# Patient Record
Sex: FEMALE | Race: WHITE | NOT HISPANIC OR LATINO | URBAN - METROPOLITAN AREA
[De-identification: names, ages, dates, MRNs, and addresses within clinical notes are randomized per-mention and may not be internally consistent; named-entity substitution may affect disease eponyms.]

---

## 2017-09-13 ENCOUNTER — IMPORTED ENCOUNTER (OUTPATIENT)
Dept: URBAN - METROPOLITAN AREA CLINIC 1 | Facility: CLINIC | Age: 57
End: 2017-09-13

## 2017-09-13 PROBLEM — H25.813: Noted: 2017-09-13

## 2017-09-13 PROBLEM — H04.123: Noted: 2017-09-13

## 2017-09-13 PROBLEM — H40.013: Noted: 2017-09-13

## 2017-09-13 PROCEDURE — 92015 DETERMINE REFRACTIVE STATE: CPT

## 2017-09-13 PROCEDURE — 92014 COMPRE OPH EXAM EST PT 1/>: CPT

## 2017-09-13 PROCEDURE — 92250 FUNDUS PHOTOGRAPHY W/I&R: CPT

## 2017-09-13 NOTE — PATIENT DISCUSSION
1.  Cataract OU: Observe for now without intervention. The patient was advised to contact us if any change or worsening of vision2. Dry Eyes OU -- Recommended to patient to use Artificial Tears BID OU3. COAG Suspect OU: (0.40/0.60)  IOP was 23 OU today.  -Fm HX. Condition was discussed with patient. Will monitor patient for progression. Fundus photos were ordered today. 4. Return for an appointment in 1 month for 10 OCT and VF 24-2 with Dr. Gasper Taylor.

## 2017-10-11 ENCOUNTER — IMPORTED ENCOUNTER (OUTPATIENT)
Dept: URBAN - METROPOLITAN AREA CLINIC 1 | Facility: CLINIC | Age: 57
End: 2017-10-11

## 2017-10-11 PROBLEM — H40.013: Noted: 2017-10-11

## 2017-10-11 PROCEDURE — 92083 EXTENDED VISUAL FIELD XM: CPT

## 2017-10-11 PROCEDURE — 99213 OFFICE O/P EST LOW 20 MIN: CPT

## 2017-10-11 PROCEDURE — 92133 CPTRZD OPH DX IMG PST SGM ON: CPT

## 2017-10-11 NOTE — PATIENT DISCUSSION
1. COAG Suspect OU: (0.40/0.60)  IOP 20/22. Patient is considered as high risk. Condition was discussed with patient. Will monitor patient for progression. OCT was done today results was minimal thinning OU 2. Return for an appointment in 6 months for 10. with Dr. Tommy Smith.

## 2018-07-16 ENCOUNTER — IMPORTED ENCOUNTER (OUTPATIENT)
Dept: URBAN - METROPOLITAN AREA CLINIC 1 | Facility: CLINIC | Age: 58
End: 2018-07-16

## 2018-07-16 PROBLEM — H16.143: Noted: 2018-07-16

## 2018-07-16 PROBLEM — H04.123: Noted: 2018-07-16

## 2018-07-16 PROBLEM — H25.813: Noted: 2018-07-16

## 2018-07-16 PROBLEM — H40.023: Noted: 2018-07-16

## 2018-07-16 PROCEDURE — 99213 OFFICE O/P EST LOW 20 MIN: CPT

## 2018-07-16 NOTE — PATIENT DISCUSSION
1. COAG Suspect OU (CD: 0.40 / 0.60) -- IOP 20 OU today. Negative family h/o Glaucoma. Patient is considered high risk. Condition was discussed with patient and patient understands. Will continue to monitor patient for any progression in condition. Patient was advised to call us with any problems questions or concerns. 2.  MATT w/ PEK OU -- Recommend the frequent use of OTC AT's BID-QID OU3. Cataracts OU -- Observe for now without intervention. The patient was advised to contact us if any change or worsening of visionReturn for an appointment in 6 MO for a 30 / 24-2 HVF / [place] Parent / CC OU with Dr. Joaquín Gamez.

## 2018-07-16 NOTE — PATIENT DISCUSSION
1. COAG Suspect OU: (0.40/0.60)  IOP 20/22. Patient is considered as high risk. Condition was discussed with patient. Will monitor patient for progression.  OCT was done today results was minimal thinning OU

## 2019-01-23 ENCOUNTER — IMPORTED ENCOUNTER (OUTPATIENT)
Dept: URBAN - METROPOLITAN AREA CLINIC 1 | Facility: CLINIC | Age: 59
End: 2019-01-23

## 2019-01-23 PROBLEM — H25.813: Noted: 2019-01-23

## 2019-01-23 PROBLEM — H04.123: Noted: 2019-01-23

## 2019-01-23 PROBLEM — H16.143: Noted: 2019-01-23

## 2019-01-23 PROBLEM — H40.023: Noted: 2019-01-23

## 2019-01-23 PROCEDURE — 92014 COMPRE OPH EXAM EST PT 1/>: CPT

## 2019-01-23 PROCEDURE — 92083 EXTENDED VISUAL FIELD XM: CPT

## 2019-01-23 PROCEDURE — 92133 CPTRZD OPH DX IMG PST SGM ON: CPT

## 2019-01-23 PROCEDURE — 92015 DETERMINE REFRACTIVE STATE: CPT

## 2019-01-23 NOTE — PATIENT DISCUSSION
1.  Glaucoma Suspect OU (CD 0.40/0.60): No progression by OCT minimal RNFL thinning OU. Documented Peripapillary Atrophy OU. HVF WNL OU. IOP 18/19. Negative family hx. Consider starting topical therapy if any future progression. Patient is considered high risk. Condition was discussed with patient and patient understands. Will continue to monitor patient for any progression in condition. Patient was advised to call us with any problems questions or concerns. 2.  MATT w/ PEK OU- Recommend ATs TID OU routinely 3. Cataract OU: Observe for now without intervention. The patient was advised to contact us if any change or worsening of vision4. Corneal Neovascularization OU MRX for glasses givenReturn for an appointment in 1 month 10/CTL Fit (check ks consider different CTLs) with Dr. Shah.

## 2019-03-05 ENCOUNTER — IMPORTED ENCOUNTER (OUTPATIENT)
Dept: URBAN - METROPOLITAN AREA CLINIC 1 | Facility: CLINIC | Age: 59
End: 2019-03-05

## 2019-03-05 PROBLEM — H25.813: Noted: 2019-03-05

## 2019-03-05 PROBLEM — H16.143: Noted: 2019-03-05

## 2019-03-05 PROBLEM — H04.123: Noted: 2019-03-05

## 2019-03-05 PROCEDURE — 99213 OFFICE O/P EST LOW 20 MIN: CPT

## 2019-03-05 NOTE — PATIENT DISCUSSION
1.  MATT w/ PEK OU -- Improved. Recommend continue the frequent use of OTC AT's TID-QID OU Routinely. 2.  Cataracts OU -- Observe for now without intervention. The patient was advised to contact us if any change or worsening of vision. 3. Glaucoma Suspect OU (CD: 0.40/0.60) -- IOP stable today at 21 / 20. Documented Peripapillary Atrophy OU. Negative family hx. Consider starting topical therapy if any future progression. Patient is considered high risk. Condition was discussed with patient and patient understands. Will continue to monitor patient for any progression in condition. Patient was advised to call us with any problems questions or concerns. 4.  Corneal Neovascularization OUFinalized CTL Rx was given to patient today. Return for an appointment in 6 MO for a 10 / Tear Lab OU (Dry Eye / K Check OU) with Dr. Jacobo Long.

## 2019-03-05 NOTE — PATIENT DISCUSSION
1.  Glaucoma Suspect OU (CD 0.40/0.60): No progression by OCT minimal RNFL thinning OU. Documented Peripapillary Atrophy OU. HVF WNL OU. IOP 18/19. Negative family hx. Consider starting topical therapy if any future progression. Patient is considered high risk. Condition was discussed with patient and patient understands. Will continue to monitor patient for any progression in condition. Patient was advised to call us with any problems questions or concerns. 2.  MATT w/ PEK OU- Recommend ATs TID OU routinely 3. Cataracts OU -- Observe for now without intervention. The patient was advised to contact us if any change or worsening of vision4.   Corneal Neovascularization OU

## 2019-09-11 ENCOUNTER — IMPORTED ENCOUNTER (OUTPATIENT)
Dept: URBAN - METROPOLITAN AREA CLINIC 1 | Facility: CLINIC | Age: 59
End: 2019-09-11

## 2019-09-11 PROBLEM — H25.813: Noted: 2019-09-11

## 2019-09-11 PROBLEM — H16.143: Noted: 2019-09-11

## 2019-09-11 PROBLEM — H04.123: Noted: 2019-09-11

## 2019-09-11 PROCEDURE — 83861 MICROFLUID ANALY TEARS: CPT

## 2019-09-11 PROCEDURE — 99213 OFFICE O/P EST LOW 20 MIN: CPT

## 2019-09-11 NOTE — PATIENT DISCUSSION
1.  MATT w/ PEK OU -- Tear Lab Results: OD- 301 / OS- 361 (9/11/19). Stable. Recommend the frequent use of OTC AT's BID-QID OU Routinely. Consider Restasis / Antonia Solo in the future PRN. 2.  Cataracts OU -- Observe for now without intervention. The patient was advised to contact us if any change or worsening of vision. 3. Glaucoma Suspect OU (CD: 0.40 / 0.60) -- IOP stable today at 19 / 20. IOP T-MAX: 23 OU. Asymmetric Cupping OS > OD. Negative Family h/o Glaucoma. Documented Peripapillary Atrophy OU. Consider starting topical therapy if any future changes / progression noted. Patient is considered high risk. Condition was discussed with patient and patient understands. Will continue to monitor patient for any progression in condition. Patient was advised to call us with any problems questions or concerns. 4.  Corneal Neovascularization OU -- Stable. Return for an appointment in 6 MO for a 27 / [place] / Bony5 Dr Ryley Judd with Dr. Kb Berger.

## 2020-08-31 ENCOUNTER — IMPORTED ENCOUNTER (OUTPATIENT)
Dept: URBAN - METROPOLITAN AREA CLINIC 1 | Facility: CLINIC | Age: 60
End: 2020-08-31

## 2020-08-31 PROBLEM — H04.123: Noted: 2020-08-31

## 2020-08-31 PROBLEM — H40.023: Noted: 2020-08-31

## 2020-08-31 PROBLEM — H16.143: Noted: 2020-08-31

## 2020-08-31 PROBLEM — H25.813: Noted: 2020-08-31

## 2020-08-31 PROCEDURE — 92133 CPTRZD OPH DX IMG PST SGM ON: CPT

## 2020-08-31 PROCEDURE — 92015 DETERMINE REFRACTIVE STATE: CPT

## 2020-08-31 PROCEDURE — 92014 COMPRE OPH EXAM EST PT 1/>: CPT

## 2020-08-31 NOTE — PATIENT DISCUSSION
1.  Glaucoma Suspect OU-  (CD: 0.40/0.60) IOP stable today at 19 OU. OCT done today shows minimal thinning OU stable from previous. Documented Peripapillary Atrophy OU. Negative family hx. Consider starting topical therapy if any future progression. Patient is considered high risk. Condition was discussed with patient and patient understands. Will continue to monitor patient for any progression in condition. Patient was advised to call us with any problems questions or concerns. 2.  Cataract OU-  Observe for now without intervention. The patient was advised to contact us if any change or worsening of vision3. MATT w/ PEK OU- Recommend At's BID OU routinely 4. Corneal Neovascularization OU- stable 5. PVD OS- RD precautions MRX for glasses and CTL given todayReturn for an appointment in 6 months 10/VF 24-2 with Dr. Tommy Smith.

## 2021-03-31 ENCOUNTER — IMPORTED ENCOUNTER (OUTPATIENT)
Dept: URBAN - METROPOLITAN AREA CLINIC 1 | Facility: CLINIC | Age: 61
End: 2021-03-31

## 2021-03-31 PROBLEM — H40.023: Noted: 2021-03-31

## 2021-03-31 PROCEDURE — 92083 EXTENDED VISUAL FIELD XM: CPT

## 2021-03-31 PROCEDURE — 92014 COMPRE OPH EXAM EST PT 1/>: CPT

## 2021-03-31 NOTE — PATIENT DISCUSSION
1.  Glaucoma Suspect OU-  (CD: 0.40/0.60) IOP stable today at 19 OU. OCT done today shows minimal thinning OU stable from previous. Documented Peripapillary Atrophy OU. Negative family hx. Consider starting topical therapy if any future progression. Patient is considered high risk. Condition was discussed with patient and patient understands. Will continue to monitor patient for any progression in condition. Patient was advised to call us with any problems questions or concerns. 2.  Cataract OU -- Observe. 3.  MATT w/ PEK OU -- Cont ATs BID OU routinely. 4.  Corneal Neovascularization OU -- Stable. 5.  H/o PVD w/o Tear OS 6.   CTL wear -- [employer] [facility]

## 2021-03-31 NOTE — PATIENT DISCUSSION
1.  Glaucoma Suspect OU -- (CD: 0.40/0.60) IOP stable 20 OU. HVF today nonspecific defects OU w/ high fixation losses/poor reliability. Negative family hx. Consider starting therapy if any future progression. Patient is considered high risk. Condition was discussed with patient and patient understands. Will continue to monitor patient for any progression in condition. Patient was advised to call us with any problems questions or concerns. 2.  Cataract OU --  Observe for now without intervention. The patient was advised to contact us if any change or worsening of vision3. MATT w/ PEK OU -- Increase ATs TID-QID OU routinely. 4.  Corneal Neovascularization OU -- Strongly recommended patient wear CLs for their FDA Approved amount of time. 5.  H/o PVD OS  Return for an appointment in 6 months for a 30/OCT/cc with Dr. Karen Barahona.

## 2021-10-01 ENCOUNTER — IMPORTED ENCOUNTER (OUTPATIENT)
Dept: URBAN - METROPOLITAN AREA CLINIC 1 | Facility: CLINIC | Age: 61
End: 2021-10-01

## 2021-10-01 PROBLEM — H43.813: Noted: 2021-10-01

## 2021-10-01 PROBLEM — H43.812: Noted: 2021-10-01

## 2021-10-01 PROBLEM — H25.813: Noted: 2021-10-01

## 2021-10-01 PROBLEM — H16.143: Noted: 2021-10-01

## 2021-10-01 PROBLEM — H04.123: Noted: 2021-10-01

## 2021-10-01 PROBLEM — H40.1131: Noted: 2021-10-01

## 2021-10-01 PROCEDURE — 92014 COMPRE OPH EXAM EST PT 1/>: CPT

## 2021-10-01 PROCEDURE — 92133 CPTRZD OPH DX IMG PST SGM ON: CPT

## 2021-10-01 PROCEDURE — 92015 DETERMINE REFRACTIVE STATE: CPT

## 2021-10-01 NOTE — PATIENT DISCUSSION
1.  Mild Open Angle Glaucoma OU -- *New Dx* (CD: 0.40/0.60) OCT shows mild progression OU. IOP 20/18. Goal Range 15-17 OU. Begin Lumigan QHS OU (Sample given). Asymmetric Cupping OS>OD. Patient to continue with current gtt regimen. Patient advised to be compliant with gtts. Condition was discussed with patient and patient understands. Will continue to monitor patient for any progression in condition. Patient was advised to call us with any problems questions or concerns. 2.  Cataract OU -- Non-surgical at this time continue to monitor for progression. The patient was advised to contact us if any change or worsening of vision3. MATT w/ PEK OU -- Cont ATs TID OU routinely. 4.  Corneal Neovascularization OU -- Likely secondary to CTL overwear. Will trial daily lenses. 5.  PVD w/o Tear OU -- *New OD. Stable OS. RD Precautions. CTL trials to be ordered by Optical ([employer] Day Toric/Sph OS). [place] OD Distance/OS Near. MRx for glasses given to patient. Return for an appointment in 3 weeks 10/VF/IOP Check (CC if trials come in) with Dr. Lita Padilla.

## 2021-10-19 ENCOUNTER — IMPORTED ENCOUNTER (OUTPATIENT)
Dept: URBAN - METROPOLITAN AREA CLINIC 1 | Facility: CLINIC | Age: 61
End: 2021-10-19

## 2021-10-19 NOTE — PATIENT DISCUSSION
1.  CC today -- patient happy with comfort and vision is much improved. CTL RX finalized and given to patient today. Return for an appointment as scheduled with Dr. Kareem Kc.

## 2021-10-27 ENCOUNTER — IMPORTED ENCOUNTER (OUTPATIENT)
Dept: URBAN - METROPOLITAN AREA CLINIC 1 | Facility: CLINIC | Age: 61
End: 2021-10-27

## 2021-10-27 PROBLEM — H40.1131: Noted: 2021-10-27

## 2021-10-27 PROCEDURE — 92083 EXTENDED VISUAL FIELD XM: CPT

## 2021-10-27 PROCEDURE — 92012 INTRM OPH EXAM EST PATIENT: CPT

## 2021-10-27 NOTE — PATIENT DISCUSSION
1.  Mild Open Angle Glaucoma OU -- (CD: 0.40/0.60) VF shows non-specific loss OU. IOP much improved and stable at 13/15. Goal Range 15-17 OU. Cont Lumigan QHS OU (Erx'd). Asymmetric Cupping OS>OD. Patient to continue with current gtt regimen. Patient advised to be compliant with gtts. Condition was discussed with patient and patient understands. Will continue to monitor patient for any progression in condition. Patient was advised to call us with any problems questions or concerns. 2.  Cataract OU -- Observe. 3.  MATT w/ PEK OU -- Cont ATs TID OU routinely. 4.  Corneal Neovascularization OU -- Likely secondary to CTL overwear. Pt to continue to daily lenses. 5.  H/o PVD w/o Tear OU 6. CTL wear ([place]; OD Distance/OS Near)Return for an appointment in 4 months 10/IOP check with Dr. Joce Hammond.

## 2022-03-12 ASSESSMENT — VISUAL ACUITY
OS_CC: J2
OS_CC: J1
OD_SC: 20/20
OD_SC: 20/20
OS_CC: J1
OU_CC: J1
OD_SC: 20/20
OS_CC: J2
OD_SC: 20/20-1
OS_CC: J1
OD_SC: 20/20
OD_SC: 20/25
OD_SC: 20/30
OS_CC: J1
OD_SC: 20/20
OD_SC: 20/20
OD_SC: 20/25
OD_SC: 20/25

## 2022-03-12 ASSESSMENT — TONOMETRY
OS_IOP_MMHG: 18
OD_IOP_MMHG: 20
OS_IOP_MMHG: 20
OD_IOP_MMHG: 13
OS_IOP_MMHG: 20
OS_IOP_MMHG: 19
OS_IOP_MMHG: 19
OD_IOP_MMHG: 18
OD_IOP_MMHG: 20
OD_IOP_MMHG: 19
OD_IOP_MMHG: 23
OS_IOP_MMHG: 23
OD_IOP_MMHG: 20
OS_IOP_MMHG: 20
OS_IOP_MMHG: 20
OD_IOP_MMHG: 21
OD_IOP_MMHG: 19
OD_IOP_MMHG: 20
OS_IOP_MMHG: 22
OS_IOP_MMHG: 15

## 2022-03-12 ASSESSMENT — KERATOMETRY
OD_K1POWER_DIOPTERS: 45.25
OS_AXISANGLE_DEGREES: 166
OS_K1POWER_DIOPTERS: 44.75
OD_K2POWER_DIOPTERS: 46.00
OD_AXISANGLE_DEGREES: 012
OD_AXISANGLE2_DEGREES: 102
OD_K1POWER_DIOPTERS: 45.00
OS_K1POWER_DIOPTERS: 44.50
OS_AXISANGLE2_DEGREES: 080
OS_AXISANGLE2_DEGREES: 081
OD_K2POWER_DIOPTERS: 46.25
OD_AXISANGLE2_DEGREES: 100
OD_AXISANGLE_DEGREES: 010
OD_K1POWER_DIOPTERS: 45.25
OS_K2POWER_DIOPTERS: 45.75
OD_AXISANGLE_DEGREES: 008
OS_K2POWER_DIOPTERS: 45.75
OS_AXISANGLE2_DEGREES: 076
OD_K2POWER_DIOPTERS: 46.50
OS_AXISANGLE_DEGREES: 171
OD_AXISANGLE2_DEGREES: 098
OS_K2POWER_DIOPTERS: 46.00
OS_K1POWER_DIOPTERS: 44.50
OS_AXISANGLE_DEGREES: 170

## 2022-03-22 ENCOUNTER — FOLLOW UP (OUTPATIENT)
Dept: URBAN - METROPOLITAN AREA CLINIC 1 | Facility: CLINIC | Age: 62
End: 2022-03-22

## 2022-03-22 DIAGNOSIS — H04.123: ICD-10-CM

## 2022-03-22 DIAGNOSIS — H25.813: ICD-10-CM

## 2022-03-22 DIAGNOSIS — H40.1131: ICD-10-CM

## 2022-03-22 DIAGNOSIS — H16.143: ICD-10-CM

## 2022-03-22 PROCEDURE — 99213 OFFICE O/P EST LOW 20 MIN: CPT

## 2022-03-22 ASSESSMENT — VISUAL ACUITY
OS_CC: J1+
OD_CC: 20/20

## 2022-03-22 ASSESSMENT — TONOMETRY
OS_IOP_MMHG: 14
OD_IOP_MMHG: 14

## 2022-03-22 NOTE — PATIENT DISCUSSION
(CD: 0.40/0.60) IOP stable 14 OU. Goal Range 15-17 OU. Cont Latanoprost QHS OU. Asymmetric Cupping OS>OD. Patient to continue with current gtt regimen. Patient advised to be compliant with gtts. Condition was discussed with patient and patient understands. Will continue to monitor patient for any progression in condition. Patient was advised to call us with any problems questions or concerns.

## 2022-10-18 ENCOUNTER — COMPREHENSIVE EXAM (OUTPATIENT)
Dept: URBAN - METROPOLITAN AREA CLINIC 1 | Facility: CLINIC | Age: 62
End: 2022-10-18

## 2022-10-18 DIAGNOSIS — H43.813: ICD-10-CM

## 2022-10-18 DIAGNOSIS — H16.143: ICD-10-CM

## 2022-10-18 DIAGNOSIS — H04.123: ICD-10-CM

## 2022-10-18 DIAGNOSIS — H40.1131: ICD-10-CM

## 2022-10-18 DIAGNOSIS — H25.813: ICD-10-CM

## 2022-10-18 PROCEDURE — 92014 COMPRE OPH EXAM EST PT 1/>: CPT

## 2022-10-18 PROCEDURE — 92310 CONTACT LENS FITTING OU: CPT

## 2022-10-18 PROCEDURE — 92015 DETERMINE REFRACTIVE STATE: CPT

## 2022-10-18 PROCEDURE — 92133 CPTRZD OPH DX IMG PST SGM ON: CPT

## 2022-10-18 ASSESSMENT — VISUAL ACUITY
OU_CC: 20/20
OU_CC: J1
OD_CC: 20/25
OS_CC: J1
OS_CC: 20/20
OD_CC: 20/20

## 2022-10-18 ASSESSMENT — TONOMETRY
OS_IOP_MMHG: 15
OD_IOP_MMHG: 15

## 2022-10-18 NOTE — PATIENT DISCUSSION
(CD: 0.40/0.60) OCT shows no progression OU.  IOP stable 14 OU. Goal Range 15-17 OU. Cont Latanoprost QHS OU. Asymmetric Cupping OS>OD. Patient to continue with current gtt regimen. Patient advised to be compliant with gtts. Condition was discussed with patient and patient understands. Will continue to monitor patient for any progression in condition. Patient was advised to call us with any problems questions or concerns. 6 months 10/HVF.

## 2023-04-18 ENCOUNTER — FOLLOW UP (OUTPATIENT)
Dept: URBAN - METROPOLITAN AREA CLINIC 1 | Facility: CLINIC | Age: 63
End: 2023-04-18

## 2023-04-18 DIAGNOSIS — H40.1131: ICD-10-CM

## 2023-04-18 PROCEDURE — 92083 EXTENDED VISUAL FIELD XM: CPT

## 2023-04-18 PROCEDURE — 92012 INTRM OPH EXAM EST PATIENT: CPT

## 2023-04-18 ASSESSMENT — VISUAL ACUITY
OD_BAT: 20/30
OS_BAT: 20/30
OD_CC: 20/20-1
OS_CC: J1+

## 2023-04-18 ASSESSMENT — TONOMETRY
OS_IOP_MMHG: 17
OD_IOP_MMHG: 14

## 2023-11-27 ENCOUNTER — COMPREHENSIVE EXAM (OUTPATIENT)
Dept: URBAN - METROPOLITAN AREA CLINIC 1 | Facility: CLINIC | Age: 63
End: 2023-11-27

## 2023-11-27 DIAGNOSIS — H40.1131: ICD-10-CM

## 2023-11-27 DIAGNOSIS — Z46.0: ICD-10-CM

## 2023-11-27 DIAGNOSIS — H25.813: ICD-10-CM

## 2023-11-27 PROCEDURE — 92133 CPTRZD OPH DX IMG PST SGM ON: CPT

## 2023-11-27 PROCEDURE — 92310-12 CONTACT LENS FITTING - 90

## 2023-11-27 PROCEDURE — 92015 DETERMINE REFRACTIVE STATE: CPT

## 2023-11-27 PROCEDURE — 92014 COMPRE OPH EXAM EST PT 1/>: CPT

## 2023-11-27 ASSESSMENT — VISUAL ACUITY
OS_BAT: 20/40
OS_CC: J1
OD_CC: J1
OD_BAT: 20/30
OD_CC: 20/25-1
OD_CC: 20/30-1
OS_CC: 20/30
OS_CC: J1

## 2023-11-27 ASSESSMENT — TONOMETRY
OD_IOP_MMHG: 13
OS_IOP_MMHG: 13

## 2024-07-31 ENCOUNTER — FOLLOW UP (OUTPATIENT)
Dept: URBAN - METROPOLITAN AREA CLINIC 1 | Facility: CLINIC | Age: 64
End: 2024-07-31

## 2024-07-31 DIAGNOSIS — H40.1131: ICD-10-CM

## 2024-07-31 PROCEDURE — 92012 INTRM OPH EXAM EST PATIENT: CPT

## 2024-07-31 PROCEDURE — 92083 EXTENDED VISUAL FIELD XM: CPT

## 2024-07-31 ASSESSMENT — TONOMETRY
OD_IOP_MMHG: 18
OS_IOP_MMHG: 18

## 2024-07-31 ASSESSMENT — VISUAL ACUITY
OS_CC: J1+
OD_CC: 20/25

## 2024-12-02 ENCOUNTER — COMPREHENSIVE EXAM (OUTPATIENT)
Age: 64
End: 2024-12-02

## 2024-12-02 DIAGNOSIS — H04.123: ICD-10-CM

## 2024-12-02 DIAGNOSIS — Z46.0: ICD-10-CM

## 2024-12-02 DIAGNOSIS — H16.143: ICD-10-CM

## 2024-12-02 DIAGNOSIS — H40.1131: ICD-10-CM

## 2024-12-02 DIAGNOSIS — H25.813: ICD-10-CM

## 2024-12-02 DIAGNOSIS — H43.813: ICD-10-CM

## 2024-12-02 PROCEDURE — 92133 CPTRZD OPH DX IMG PST SGM ON: CPT

## 2024-12-02 PROCEDURE — 92310-3 LEVEL 3 SOFT LENS UPDATE

## 2024-12-02 PROCEDURE — 92015 DETERMINE REFRACTIVE STATE: CPT

## 2024-12-02 PROCEDURE — 92014 COMPRE OPH EXAM EST PT 1/>: CPT

## 2025-04-16 ENCOUNTER — EMERGENCY VISIT (OUTPATIENT)
Age: 65
End: 2025-04-16

## 2025-04-16 DIAGNOSIS — B30.9: ICD-10-CM

## 2025-04-16 PROCEDURE — 92012 INTRM OPH EXAM EST PATIENT: CPT

## 2025-04-23 ENCOUNTER — FOLLOW UP (OUTPATIENT)
Age: 65
End: 2025-04-23

## 2025-04-23 DIAGNOSIS — B30.9: ICD-10-CM

## 2025-04-23 PROCEDURE — 92012 INTRM OPH EXAM EST PATIENT: CPT

## 2025-06-09 ENCOUNTER — FOLLOW UP (OUTPATIENT)
Age: 65
End: 2025-06-09

## 2025-06-09 DIAGNOSIS — B30.9: ICD-10-CM

## 2025-06-09 PROCEDURE — 99213 OFFICE O/P EST LOW 20 MIN: CPT

## 2025-06-26 ENCOUNTER — FOLLOW UP (OUTPATIENT)
Age: 65
End: 2025-06-26

## 2025-06-26 DIAGNOSIS — H40.1131: ICD-10-CM

## 2025-06-26 DIAGNOSIS — B30.9: ICD-10-CM

## 2025-06-26 PROCEDURE — 92083 EXTENDED VISUAL FIELD XM: CPT

## 2025-06-26 PROCEDURE — 92012 INTRM OPH EXAM EST PATIENT: CPT
